# Patient Record
Sex: MALE | Race: BLACK OR AFRICAN AMERICAN | URBAN - METROPOLITAN AREA
[De-identification: names, ages, dates, MRNs, and addresses within clinical notes are randomized per-mention and may not be internally consistent; named-entity substitution may affect disease eponyms.]

---

## 2019-10-04 ENCOUNTER — HISTORICAL (OUTPATIENT)
Dept: ADMINISTRATIVE | Facility: HOSPITAL | Age: 24
End: 2019-10-04

## 2022-04-30 VITALS — BODY MASS INDEX: 37.27 KG/M2 | WEIGHT: 237.44 LBS | HEIGHT: 67 IN

## 2022-05-02 NOTE — HISTORICAL [facility] CERNER
This is a historical note converted from Mike. Formatting and pictures may have been removed.  Please reference Mike for original formatting and attached multimedia. Chief Complaint  Referral for Lt hand injury  History of Present Illness  This is a 24-year-old right-hand dominant male?who presents as a new patient for evaluation of a left middle finger fracture.? He reports that he injured his finger?while riding a 4 raya?almost 3 weeks ago.? He reports that he?is not currently working, but he enjoys?mudding?and also playing video games. ?He has been wearing an aluminum finger splint.  Review of Systems  Negative other than HPI  Physical Exam  Vitals & Measurements  HT:?170?cm? WT:?107.7?kg? BMI:?37.27?  NAD  Resp unlabored  CV RR  Left upper extremity  Skin intact  Swelling and tenderness over the?middle phalanx of the left middle finger  Sensation intact to light touch  Extensor tendon,?FDS, FDP?intact  Patient has?limited flexion of the?3rd finger secondary to swelling  Is no cosmetic deformity  Assessment/Plan  Finger fracture, left?S62.609A  Ordered:  Clinic Follow up, *Est. 10/18/19 3:00:00 CDT, Order for future visit, Finger fracture, left, [facility]  XR Hand Third Digit Left Min 2 Views, Routine, *Est. 10/18/19 3:00:00 CDT, Fracture, None, Ambulatory, Rad Type, Order for future visit, Finger fracture, left, Not Scheduled, *Est. 10/18/19 3:00:00 CDT  ?  Is a 24-year-old right-hand dominant male with a?left?middle finger middle phalanx fracture?sustained almost 3 weeks ago.? X-rays today show that the alignment of the fracture?is unchanged compared to?his x-ray?on the date of injury.? The fracture is minimally displaced and?there is no?cosmetic deformity. ?He does still have pain and swelling and stiffness developing in that?middle finger.? At this point,?I will rd tape and?and then bring him back again in 2 weeks for a repeat x-ray and repeat?clinical evaluation.   Medications  diclofenac  sodium 75 mg oral delayed release tablet, 75 mg= 1 tab(s), Oral, BID  Norco 5 mg-325 mg oral tablet, 1 tab(s), Oral, Once  Diagnostic Results  X-ray 2 views of the left middle finger reveal?an oblique fracture through the?middle phalanx?with minimal displacement. ?There is no significant change in alignment compared to x-rays performed on September 16.      [facility] medical record was reviewed with?Dr. Owens.? HPI, PE and treatment plan was reviewed.? Treatment plan was reasonable and necessary.??Imaging was reviewed at the time of visit.??

## 2024-10-17 ENCOUNTER — HOSPITAL ENCOUNTER (EMERGENCY)
Facility: HOSPITAL | Age: 29
Discharge: HOME | End: 2024-10-17
Attending: EMERGENCY MEDICINE
Payer: MEDICAID

## 2024-10-17 VITALS
SYSTOLIC BLOOD PRESSURE: 147 MMHG | RESPIRATION RATE: 20 BRPM | DIASTOLIC BLOOD PRESSURE: 98 MMHG | OXYGEN SATURATION: 99 % | TEMPERATURE: 98 F | BODY MASS INDEX: 38.45 KG/M2 | HEART RATE: 73 BPM | HEIGHT: 67 IN | WEIGHT: 245 LBS

## 2024-10-17 DIAGNOSIS — M76.61 ACHILLES TENDINITIS OF RIGHT LOWER EXTREMITY: Primary | ICD-10-CM

## 2024-10-17 DIAGNOSIS — M79.671 PAIN OF RIGHT HEEL: ICD-10-CM

## 2024-10-17 PROCEDURE — 99283 EMERGENCY DEPT VISIT LOW MDM: CPT | Mod: 25

## 2024-10-17 RX ORDER — IBUPROFEN 800 MG/1
800 TABLET ORAL 3 TIMES DAILY
Qty: 30 TABLET | Refills: 0 | Status: SHIPPED | OUTPATIENT
Start: 2024-10-17 | End: 2024-10-27

## 2024-10-17 NOTE — ED PROVIDER NOTES
Encounter Date: 10/17/2024       History     Chief Complaint   Patient presents with    Ankle Pain     Pt c/o pain to right ankle onset one month ago, states hx of torn achilles.     The history is provided by the patient.   Ankle Pain  This is a new problem. The current episode started more than 1 week ago. The problem occurs constantly. The problem has not changed since onset.Pertinent negatives include no chest pain and no shortness of breath. The symptoms are aggravated by walking. Nothing relieves the symptoms.   Claims prior h/o torn Achilles tendon - no surgical intervention.  Re-injured a few weeks ago when he suddenly began running from [employer].  Continues to have pain in the right Achilles area    Review of patient's allergies indicates:  No Known Allergies  History reviewed. No pertinent past medical history.  History reviewed. No pertinent surgical history.  No family history on file.  Social History     Tobacco Use    Smoking status: Never    Smokeless tobacco: Never     Review of Systems   Constitutional:  Negative for fever.   HENT:  Negative for sore throat.    Respiratory:  Negative for shortness of breath.    Cardiovascular:  Negative for chest pain.   Gastrointestinal:  Negative for nausea.   Genitourinary:  Negative for dysuria.   Musculoskeletal:  Negative for back pain.   Skin:  Negative for rash.   Neurological:  Negative for weakness.   Hematological:  Does not bruise/bleed easily.       Physical Exam     Initial Vitals [10/17/24 1127]   BP Pulse Resp Temp SpO2   (!) 147/98 73 20 97.7 °F (36.5 °C) 99 %      MAP       --         Physical Exam    Nursing note and vitals reviewed.  Constitutional: He appears well-developed and well-nourished.   HENT:   Head: Normocephalic and atraumatic.   Right Ear: External ear normal.   Left Ear: External ear normal.   Nose: Nose normal.   Eyes: Conjunctivae and EOM are normal. Pupils are equal, round, and reactive to light.   Neck: Neck supple.    Normal range of motion.  Cardiovascular:  Normal rate, regular rhythm, normal heart sounds and intact distal pulses.           Pulmonary/Chest: Breath sounds normal.   Abdominal: Abdomen is soft. Bowel sounds are normal.   Musculoskeletal:         General: Normal range of motion.      Cervical back: Normal range of motion and neck supple.        Feet:       Comments: Negative Dodd test     Neurological: He is alert and oriented to person, place, and time. He has normal strength. GCS score is 15. GCS eye subscore is 4. GCS verbal subscore is 5. GCS motor subscore is 6.   Skin: Skin is warm and dry. Capillary refill takes less than 2 seconds.   Psychiatric: He has a normal mood and affect. His behavior is normal. Judgment and thought content normal.         ED Course   Procedures  Labs Reviewed - No data to display       Imaging Results              X-Ray Ankle Complete Right (Preliminary result)  Result time 10/17/24 12:31:18      Wet Read by Ramón Lea MD (10/17/24 12:31:18, [facility] - Emergency Dept, Emergency Medicine)    No fracture or dislocation                                     Medications - No data to display  Medical Decision Making  Amount and/or Complexity of Data Reviewed  Radiology: ordered and independent interpretation performed. Decision-making details documented in ED Course.    Differential includes:  Achilles tear, strain, avulsion fracture.  Will obtain ankle x-rays and if negative, place in walking boot and refer to ortho.                                  Clinical Impression:  Final diagnoses:  [M79.671] Pain of right heel  [M76.61] Achilles tendinitis of right lower extremity (Primary)          ED Disposition Condition    Discharge Stable          ED Prescriptions       Medication Sig Dispense Start Date End Date Auth. Provider    ibuprofen (ADVIL,MOTRIN) 800 MG tablet Take 1 tablet (800 mg total) by mouth 3 (three) times daily. for 10 days 30 tablet  10/17/2024 10/27/2024 Ramón Lea MD          Follow-up Information       Follow up With Specialties Details Why Contact Info    The orthopedic clinic will contact you with a follow up appointment                 Ramón Lea MD  10/17/24 0683